# Patient Record
Sex: MALE | Race: WHITE | NOT HISPANIC OR LATINO | Employment: FULL TIME | URBAN - METROPOLITAN AREA
[De-identification: names, ages, dates, MRNs, and addresses within clinical notes are randomized per-mention and may not be internally consistent; named-entity substitution may affect disease eponyms.]

---

## 2023-09-07 ENCOUNTER — HOSPITAL ENCOUNTER (EMERGENCY)
Facility: HOSPITAL | Age: 68
Discharge: HOME | End: 2023-09-08
Attending: EMERGENCY MEDICINE
Payer: MEDICARE

## 2023-09-07 DIAGNOSIS — R13.19 ESOPHAGEAL DYSPHAGIA: ICD-10-CM

## 2023-09-07 DIAGNOSIS — K22.89 ESOPHAGEAL MASS: Primary | ICD-10-CM

## 2023-09-07 LAB
ALBUMIN SERPL BCP-MCNC: 3.5 G/DL (ref 3.5–5.2)
ALP SERPL-CCNC: 123 U/L (ref 55–135)
ALT SERPL W/O P-5'-P-CCNC: 15 U/L (ref 10–44)
ANION GAP SERPL CALC-SCNC: 13 MMOL/L (ref 8–16)
AST SERPL-CCNC: 25 U/L (ref 10–40)
BASOPHILS # BLD AUTO: 0.03 K/UL (ref 0–0.2)
BASOPHILS NFR BLD: 0.7 % (ref 0–1.9)
BILIRUB SERPL-MCNC: 0.5 MG/DL (ref 0.1–1)
BUN SERPL-MCNC: 10 MG/DL (ref 8–23)
CALCIUM SERPL-MCNC: 10.4 MG/DL (ref 8.7–10.5)
CHLORIDE SERPL-SCNC: 106 MMOL/L (ref 95–110)
CO2 SERPL-SCNC: 22 MMOL/L (ref 23–29)
CREAT SERPL-MCNC: 0.8 MG/DL (ref 0.5–1.4)
DIFFERENTIAL METHOD: ABNORMAL
EOSINOPHIL # BLD AUTO: 0.1 K/UL (ref 0–0.5)
EOSINOPHIL NFR BLD: 2 % (ref 0–8)
ERYTHROCYTE [DISTWIDTH] IN BLOOD BY AUTOMATED COUNT: 14.2 % (ref 11.5–14.5)
EST. GFR  (NO RACE VARIABLE): >60 ML/MIN/1.73 M^2
GLUCOSE SERPL-MCNC: 104 MG/DL (ref 70–110)
HCT VFR BLD AUTO: 31.3 % (ref 40–54)
HGB BLD-MCNC: 10.6 G/DL (ref 14–18)
IMM GRANULOCYTES # BLD AUTO: 0.02 K/UL (ref 0–0.04)
IMM GRANULOCYTES NFR BLD AUTO: 0.4 % (ref 0–0.5)
LYMPHOCYTES # BLD AUTO: 1.2 K/UL (ref 1–4.8)
LYMPHOCYTES NFR BLD: 25.5 % (ref 18–48)
MCH RBC QN AUTO: 33.7 PG (ref 27–31)
MCHC RBC AUTO-ENTMCNC: 33.9 G/DL (ref 32–36)
MCV RBC AUTO: 99 FL (ref 82–98)
MONOCYTES # BLD AUTO: 0.6 K/UL (ref 0.3–1)
MONOCYTES NFR BLD: 12.4 % (ref 4–15)
NEUTROPHILS # BLD AUTO: 2.7 K/UL (ref 1.8–7.7)
NEUTROPHILS NFR BLD: 59 % (ref 38–73)
NRBC BLD-RTO: 0 /100 WBC
PLATELET # BLD AUTO: 141 K/UL (ref 150–450)
PMV BLD AUTO: 10.2 FL (ref 9.2–12.9)
POTASSIUM SERPL-SCNC: 4 MMOL/L (ref 3.5–5.1)
PROT SERPL-MCNC: 6.9 G/DL (ref 6–8.4)
RBC # BLD AUTO: 3.15 M/UL (ref 4.6–6.2)
SODIUM SERPL-SCNC: 141 MMOL/L (ref 136–145)
WBC # BLD AUTO: 4.51 K/UL (ref 3.9–12.7)

## 2023-09-07 PROCEDURE — 25500020 PHARM REV CODE 255: Performed by: NURSE PRACTITIONER

## 2023-09-07 PROCEDURE — 96374 THER/PROPH/DIAG INJ IV PUSH: CPT

## 2023-09-07 PROCEDURE — 96375 TX/PRO/DX INJ NEW DRUG ADDON: CPT

## 2023-09-07 PROCEDURE — 63600175 PHARM REV CODE 636 W HCPCS: Performed by: NURSE PRACTITIONER

## 2023-09-07 PROCEDURE — 80053 COMPREHEN METABOLIC PANEL: CPT | Performed by: NURSE PRACTITIONER

## 2023-09-07 PROCEDURE — 85025 COMPLETE CBC W/AUTO DIFF WBC: CPT | Performed by: NURSE PRACTITIONER

## 2023-09-07 RX ORDER — ONDANSETRON 2 MG/ML
4 INJECTION INTRAMUSCULAR; INTRAVENOUS
Status: COMPLETED | OUTPATIENT
Start: 2023-09-07 | End: 2023-09-07

## 2023-09-07 RX ORDER — MORPHINE SULFATE 4 MG/ML
4 INJECTION, SOLUTION INTRAMUSCULAR; INTRAVENOUS
Status: COMPLETED | OUTPATIENT
Start: 2023-09-07 | End: 2023-09-07

## 2023-09-07 RX ADMIN — MORPHINE SULFATE 4 MG: 4 INJECTION INTRAVENOUS at 09:09

## 2023-09-07 RX ADMIN — ONDANSETRON 4 MG: 2 INJECTION INTRAMUSCULAR; INTRAVENOUS at 09:09

## 2023-09-07 RX ADMIN — IOHEXOL 100 ML: 350 INJECTION, SOLUTION INTRAVENOUS at 10:09

## 2023-09-08 VITALS
RESPIRATION RATE: 18 BRPM | DIASTOLIC BLOOD PRESSURE: 66 MMHG | WEIGHT: 172.94 LBS | SYSTOLIC BLOOD PRESSURE: 134 MMHG | TEMPERATURE: 98 F | BODY MASS INDEX: 22.92 KG/M2 | HEIGHT: 73 IN | OXYGEN SATURATION: 98 % | HEART RATE: 74 BPM

## 2023-09-08 PROCEDURE — 25000003 PHARM REV CODE 250: Performed by: EMERGENCY MEDICINE

## 2023-09-08 PROCEDURE — 99285 EMERGENCY DEPT VISIT HI MDM: CPT

## 2023-09-08 RX ORDER — HYDROCODONE BITARTRATE AND ACETAMINOPHEN 7.5; 325 MG/15ML; MG/15ML
5 SOLUTION ORAL
Status: COMPLETED | OUTPATIENT
Start: 2023-09-08 | End: 2023-09-08

## 2023-09-08 RX ORDER — HYDROCODONE BITARTRATE AND ACETAMINOPHEN 7.5; 325 MG/15ML; MG/15ML
10 SOLUTION ORAL EVERY 6 HOURS PRN
Qty: 180 ML | Refills: 0 | Status: SHIPPED | OUTPATIENT
Start: 2023-09-08

## 2023-09-08 RX ADMIN — HYDROCODONE BITARTRATE AND ACETAMINOPHEN 5 ML: 7.5; 325 SOLUTION ORAL at 01:09

## 2023-09-08 NOTE — FIRST PROVIDER EVALUATION
"Medical screening examination initiated.  I have conducted a focused provider triage encounter, findings are as follows:    Brief history of present illness:  Patient presents with difficulty swallowing and has blood in his feeding tube.  Has not used feeding tube in a couple of days.  Has not been eating due to pain.  History of esophageal cancer.    Vitals:    09/07/23 2105   BP: (!) 140/69   BP Location: Right arm   Patient Position: Sitting   Pulse: 86   Resp: 18   Temp: 98.1 °F (36.7 °C)   TempSrc: Oral   SpO2: 97%   Weight: 78.4 kg (172 lb 15.2 oz)   Height: 6' 1" (1.854 m)       Pertinent physical exam:  Vital signs stable, no acute distress noted    Brief workup plan:  Labs    Preliminary workup initiated; this workup will be continued and followed by the physician or advanced practice provider that is assigned to the patient when roomed.  "

## 2023-09-08 NOTE — ED PROVIDER NOTES
SCRIBE #1 NOTE: I, Chirag Cardona, am scribing for, and in the presence of, Cesario Lao MD. I have scribed the entire note.       History     Chief Complaint   Patient presents with    Dysphagia     Pt c/o throat pain, hx of esophageal cancer stage 4 and all treatment stopped last week per pt's wishes. Pt is unable to eat or drink and has had difficulty using feeding tube stating blood coming from tube for the last two days. Pt states pain is very unbearable at this point.     Review of patient's allergies indicates:  No Known Allergies      History of Present Illness     HPI    9/7/2023, 11:23 PM  History obtained from the patient      History of Present Illness: Nicholas Perez is a 68 y.o. male patient with a PMHx of esophageal CA stage 4 who presents to the Emergency Department for evaluation of dysphagia which onset gradually 2 days ago. Pt states he has a PET scan scheduled for the 11th but has stopped all treatment as he feels like the treatment is not working. Pt reports having dysphagia due to pain and notes his PEG tube has blood coming out of it. Pt is able to manage oral secretions. Symptoms are constant and moderate to severe in severity. No mitigating or exacerbating factors reported. Associated sxs include BLE numbness, blood in stool. Patient denies any fever, chills, abdominal pain, CP, SOB, constipation, n/v/d, and all other sxs at this time. No further complaints or concerns at this time.       Arrival mode: Personal vehicle    PCP: Vince Lara MD        Past Medical History:  Past Medical History:   Diagnosis Date    Hypertension        Past Surgical History:  Past Surgical History:   Procedure Laterality Date    TONSILLECTOMY           Family History:  No family history on file.    Social History:  Social History     Tobacco Use    Smoking status: Every Day    Smokeless tobacco: Not on file   Substance and Sexual Activity    Alcohol use: No    Drug use: Not on file    Sexual activity: Not  on file        Review of Systems     Review of Systems   Constitutional:  Positive for activity change (decreased PO intake due to pain). Negative for fever.   HENT:  Positive for trouble swallowing. Negative for sore throat.    Respiratory:  Negative for shortness of breath.    Cardiovascular:  Negative for chest pain.   Gastrointestinal:  Positive for blood in stool. Negative for nausea.   Genitourinary:  Negative for dysuria.   Musculoskeletal:  Negative for back pain.   Skin:  Negative for rash.   Neurological:  Positive for numbness (BLE). Negative for weakness.   Hematological:  Does not bruise/bleed easily.   All other systems reviewed and are negative.       Physical Exam     Initial Vitals [09/07/23 2105]   BP Pulse Resp Temp SpO2   (!) 140/69 86 18 98.1 °F (36.7 °C) 97 %      MAP       --          Physical Exam   Nursing Notes and Vital Signs Reviewed.  Constitutional: Patient is in no acute distress. Well-developed and well-nourished.  Head: Atraumatic. Normocephalic.  Eyes: PERRL. EOM intact. Conjunctivae are not pale. No scleral icterus.  ENT: Mucous membranes are moist. Oropharynx is clear and symmetric. Pt is able to manage oral secretions.  Neck: Supple. Full ROM. No lymphadenopathy.  Cardiovascular: Regular rate. Regular rhythm. No murmurs, rubs, or gallops. Distal pulses are 2+ and symmetric.  Pulmonary/Chest: No respiratory distress. Clear to auscultation bilaterally. No wheezing or rales.  Abdominal: Soft and non-distended.  There is no tenderness.  No rebound, guarding, or rigidity. Good bowel sounds. PEG tube in place and functioning appropriately.   Genitourinary: No CVA tenderness  Musculoskeletal: Moves all extremities. No obvious deformities. No edema. No calf tenderness.  Skin: Warm and dry.  Neurological:  Alert, awake, and appropriate.  Normal speech.  No acute focal neurological deficits are appreciated.  Psychiatric: Normal affect. Good eye contact. Appropriate in content.     ED  "Course   Procedures  ED Vital Signs:  Vitals:    09/07/23 2105 09/07/23 2159 09/07/23 2210 09/07/23 2211   BP: (!) 140/69  (!) 153/68    Pulse: 86  68 72   Resp: 18 18 16    Temp: 98.1 °F (36.7 °C)      TempSrc: Oral      SpO2: 97%  98%    Weight: 78.4 kg (172 lb 15.2 oz)      Height: 6' 1" (1.854 m)       09/07/23 2244 09/08/23 0031 09/08/23 0055 09/08/23 0116   BP: 129/66 (!) 125/59     Pulse: 76 80 72    Resp: (!) 47 (!) 56 18 18   Temp:       TempSrc:       SpO2: 99% 100% 98%    Weight:       Height:        09/08/23 0131   BP: 134/66   Pulse: 74   Resp:    Temp:    TempSrc:    SpO2: 98%   Weight:    Height:        Abnormal Lab Results:  Labs Reviewed   CBC W/ AUTO DIFFERENTIAL - Abnormal; Notable for the following components:       Result Value    RBC 3.15 (*)     Hemoglobin 10.6 (*)     Hematocrit 31.3 (*)     MCV 99 (*)     MCH 33.7 (*)     Platelets 141 (*)     All other components within normal limits   COMPREHENSIVE METABOLIC PANEL - Abnormal; Notable for the following components:    CO2 22 (*)     All other components within normal limits        All Lab Results:  Results for orders placed or performed during the hospital encounter of 09/07/23   CBC auto differential   Result Value Ref Range    WBC 4.51 3.90 - 12.70 K/uL    RBC 3.15 (L) 4.60 - 6.20 M/uL    Hemoglobin 10.6 (L) 14.0 - 18.0 g/dL    Hematocrit 31.3 (L) 40.0 - 54.0 %    MCV 99 (H) 82 - 98 fL    MCH 33.7 (H) 27.0 - 31.0 pg    MCHC 33.9 32.0 - 36.0 g/dL    RDW 14.2 11.5 - 14.5 %    Platelets 141 (L) 150 - 450 K/uL    MPV 10.2 9.2 - 12.9 fL    Immature Granulocytes 0.4 0.0 - 0.5 %    Gran # (ANC) 2.7 1.8 - 7.7 K/uL    Immature Grans (Abs) 0.02 0.00 - 0.04 K/uL    Lymph # 1.2 1.0 - 4.8 K/uL    Mono # 0.6 0.3 - 1.0 K/uL    Eos # 0.1 0.0 - 0.5 K/uL    Baso # 0.03 0.00 - 0.20 K/uL    nRBC 0 0 /100 WBC    Gran % 59.0 38.0 - 73.0 %    Lymph % 25.5 18.0 - 48.0 %    Mono % 12.4 4.0 - 15.0 %    Eosinophil % 2.0 0.0 - 8.0 %    Basophil % 0.7 0.0 - 1.9 %    " Differential Method Automated    Comprehensive metabolic panel   Result Value Ref Range    Sodium 141 136 - 145 mmol/L    Potassium 4.0 3.5 - 5.1 mmol/L    Chloride 106 95 - 110 mmol/L    CO2 22 (L) 23 - 29 mmol/L    Glucose 104 70 - 110 mg/dL    BUN 10 8 - 23 mg/dL    Creatinine 0.8 0.5 - 1.4 mg/dL    Calcium 10.4 8.7 - 10.5 mg/dL    Total Protein 6.9 6.0 - 8.4 g/dL    Albumin 3.5 3.5 - 5.2 g/dL    Total Bilirubin 0.5 0.1 - 1.0 mg/dL    Alkaline Phosphatase 123 55 - 135 U/L    AST 25 10 - 40 U/L    ALT 15 10 - 44 U/L    eGFR >60 >60 mL/min/1.73 m^2    Anion Gap 13 8 - 16 mmol/L         Imaging Results:  Imaging Results               CT Abdomen Pelvis With Contrast (Final result)  Result time 09/07/23 22:49:31      Final result by Yifan Juárez MD (09/07/23 22:49:31)                   Impression:      Obstructing esophageal mass protruding into the stomach and numerous hepatic metastatic lesions as above.  Mixed density material within the visualized esophagus consistent with obstruction.    Gastrostomy tube in place in good position.    This report was flagged in Epic as abnormal.    All CT scans at this facility are performed  using dose modulation techniques as appropriate to performed exam including the following:  automated exposure control; adjustment of mA and/or kV according to the patients size (this includes techniques or standardized protocols for targeted exams where dose is matched to indication/reason for exam: i.e. extremities or head);  iterative reconstruction technique.      Electronically signed by: Yifan Juárez  Date:    09/07/2023  Time:    22:49               Narrative:    EXAMINATION:  CT ABDOMEN PELVIS WITH CONTRAST    CLINICAL HISTORY:  Abdominal pain, acute, nonlocalized;    TECHNIQUE:  Low dose axial images, sagittal and coronal reformations were obtained from the lung bases to the pubic symphysis.  Contrast was not administered.    COMPARISON:  None    FINDINGS:  Heart: Normal in  size. No pericardial effusion.    Lung Bases: Presumed esophageal mass proceeding into the stomach with probable obstruction noting upstream mixed density material in the esophagus.    Liver: Numerous metastatic lesions throughout the liver.    Gallbladder: Surgically absent.    Bile Ducts: No evidence of dilated ducts.    Pancreas: No mass or peripancreatic fat stranding.    Spleen: Unremarkable.    Adrenals: Unremarkable.    Kidneys/ Ureters: Right renal simple cyst.  No hydronephrosis.    Bladder: No evidence of wall thickening.    Reproductive organs: Unremarkable.    GI Tract/Mesentery: No evidence of bowel obstruction or inflammation.  Gastrostomy tube in place.    Peritoneal Space: No ascites. No free air.    Retroperitoneum: No significant adenopathy.    Abdominal wall: Unremarkable.    Vasculature: No significant atherosclerosis or aneurysm.    Bones: No acute fracture.                        Final result by Yifan Juárez MD (09/07/23 22:49:31)                   Impression:      Obstructing esophageal mass protruding into the stomach and numerous hepatic metastatic lesions as above.  Mixed density material within the visualized esophagus consistent with obstruction.    Gastrostomy tube in place in good position.    This report was flagged in Epic as abnormal.    All CT scans at this facility are performed  using dose modulation techniques as appropriate to performed exam including the following:  automated exposure control; adjustment of mA and/or kV according to the patients size (this includes techniques or standardized protocols for targeted exams where dose is matched to indication/reason for exam: i.e. extremities or head);  iterative reconstruction technique.      Electronically signed by: Yifan Juárez  Date:    09/07/2023  Time:    22:49               Narrative:    EXAMINATION:  CT ABDOMEN PELVIS WITH CONTRAST    CLINICAL HISTORY:  Abdominal pain, acute, nonlocalized;    TECHNIQUE:  Low dose  axial images, sagittal and coronal reformations were obtained from the lung bases to the pubic symphysis.  Contrast was not administered.    COMPARISON:  None    FINDINGS:  Heart: Normal in size. No pericardial effusion.    Lung Bases: Presumed esophageal mass proceeding into the stomach with probable obstruction noting upstream mixed density material in the esophagus.    Liver: Numerous metastatic lesions throughout the liver.    Gallbladder: Surgically absent.    Bile Ducts: No evidence of dilated ducts.    Pancreas: No mass or peripancreatic fat stranding.    Spleen: Unremarkable.    Adrenals: Unremarkable.    Kidneys/ Ureters: Right renal simple cyst.  No hydronephrosis.    Bladder: No evidence of wall thickening.    Reproductive organs: Unremarkable.    GI Tract/Mesentery: No evidence of bowel obstruction or inflammation.  Gastrostomy tube in place.    Peritoneal Space: No ascites. No free air.    Retroperitoneum: No significant adenopathy.    Abdominal wall: Unremarkable.    Vasculature: No significant atherosclerosis or aneurysm.    Bones: No acute fracture.                        Final result by Yifan Juárez MD (09/07/23 22:49:31)                   Impression:      Obstructing esophageal mass protruding into the stomach and numerous hepatic metastatic lesions as above.  Mixed density material within the visualized esophagus consistent with obstruction.    Gastrostomy tube in place in good position.    This report was flagged in Epic as abnormal.    All CT scans at this facility are performed  using dose modulation techniques as appropriate to performed exam including the following:  automated exposure control; adjustment of mA and/or kV according to the patients size (this includes techniques or standardized protocols for targeted exams where dose is matched to indication/reason for exam: i.e. extremities or head);  iterative reconstruction technique.      Electronically signed by: Yifan  Franck  Date:    09/07/2023  Time:    22:49               Narrative:    EXAMINATION:  CT ABDOMEN PELVIS WITH CONTRAST    CLINICAL HISTORY:  Abdominal pain, acute, nonlocalized;    TECHNIQUE:  Low dose axial images, sagittal and coronal reformations were obtained from the lung bases to the pubic symphysis.  Contrast was not administered.    COMPARISON:  None    FINDINGS:  Heart: Normal in size. No pericardial effusion.    Lung Bases: Presumed esophageal mass proceeding into the stomach with probable obstruction noting upstream mixed density material in the esophagus.    Liver: Numerous metastatic lesions throughout the liver.    Gallbladder: Surgically absent.    Bile Ducts: No evidence of dilated ducts.    Pancreas: No mass or peripancreatic fat stranding.    Spleen: Unremarkable.    Adrenals: Unremarkable.    Kidneys/ Ureters: Right renal simple cyst.  No hydronephrosis.    Bladder: No evidence of wall thickening.    Reproductive organs: Unremarkable.    GI Tract/Mesentery: No evidence of bowel obstruction or inflammation.  Gastrostomy tube in place.    Peritoneal Space: No ascites. No free air.    Retroperitoneum: No significant adenopathy.    Abdominal wall: Unremarkable.    Vasculature: No significant atherosclerosis or aneurysm.    Bones: No acute fracture.                                         The Emergency Provider reviewed the vital signs and test results, which are outlined above.     ED Discussion       1:38 AM: Reassessed pt at this time. Pt states he will call his PCP in [place] tomorrow to discuss esophageal stent placement. Discussed with pt all pertinent ED information and results. Discussed pt dx and plan of tx. Gave pt all f/u and return to the ED instructions. All questions and concerns were addressed at this time. Pt expresses understanding of information and instructions, and is comfortable with plan to discharge. Pt is stable for discharge.    I discussed with patient and/or  family/caretaker that evaluation in the ED does not suggest any emergent or life threatening medical conditions requiring immediate intervention beyond what was provided in the ED, and I believe patient is safe for discharge.  Regardless, an unremarkable evaluation in the ED does not preclude the development or presence of a serious of life threatening condition. As such, patient was instructed to return immediately for any worsening or change in current symptoms.         Medical Decision Making  Amount and/or Complexity of Data Reviewed  Labs: ordered. Decision-making details documented in ED Course.  Radiology: ordered. Decision-making details documented in ED Course.  Discussion of management or test interpretation with external provider(s): 67 yo with h/o esophageal ca with liver mets presents with dysphagia x about 1 week.  Pt tolerates saliva.    CT report - Obstructing esophageal mass protruding into the stomach and numerous hepatic metastatic lesions as above.  Mixed density material within the visualized esophagus consistent with obstruction.  Gastrostomy tube in place in good position.  Consulting Dr. Chowdhury (Gastroenterology) to determined if pt benefit from esophageal stent. Dr. Chowdhury states there are no esophageal stent services in  at this time.       Risk  Prescription drug management.                 ED Medication(s):  Medications   morphine injection 4 mg (4 mg Intravenous Given 9/7/23 2159)   ondansetron injection 4 mg (4 mg Intravenous Given 9/7/23 2156)   iohexoL (OMNIPAQUE 350) injection 100 mL (100 mLs Intravenous Given 9/7/23 2236)   hydrocodone-apap 7.5-325 MG/15 ML oral solution 5 mL (5 mLs Per G Tube Given 9/8/23 0116)       Discharge Medication List as of 9/8/2023  1:43 AM        START taking these medications    Details   hydrocodone-acetaminophen (HYCET) solution 7.5-325 mg/15mL Take 10 mLs by mouth every 6 (six) hours as needed for Pain., Starting Fri 9/8/2023, Print               Follow-up Information       Vince Lara MD In 1 day.    Specialty: Family Medicine  Contact information:  [address] [place] [address]  [phone]               [facility] - Emergency Dept..    Specialty: Emergency Medicine  Why: As needed, If symptoms worsen  Contact information:  [address] [facility] [address] [phone]             [facility] In 3 days.    Specialty: Hematology and Oncology  Contact information:  [address] [facility] [address] [phone]                               Scribe Attestation:   Scribe #1: I performed the above scribed service and the documentation accurately describes the services I performed. I attest to the accuracy of the note.     Attending:   Physician Attestation Statement for Scribe #1: I, Cesario Lao MD, personally performed the services described in this documentation, as scribed by Chirag Cardona, in my presence, and it is both accurate and complete.           Clinical Impression       ICD-10-CM ICD-9-CM   1. Esophageal mass  K22.89 530.89   2. Esophageal dysphagia  R13.19 787.29       Disposition:   Disposition: Discharged  Condition: Stable         Cesario Lao MD  09/08/23 0537

## 2023-09-18 ENCOUNTER — DOCUMENTATION ONLY (OUTPATIENT)
Dept: HEMATOLOGY/ONCOLOGY | Facility: CLINIC | Age: 68
End: 2023-09-18
Payer: MEDICARE

## 2023-09-18 ENCOUNTER — TELEPHONE (OUTPATIENT)
Dept: HEMATOLOGY/ONCOLOGY | Facility: CLINIC | Age: 68
End: 2023-09-18
Payer: MEDICARE

## 2023-09-18 NOTE — NURSING
1150am: Outside records received from [facility] [facility]. Still need Radiology Imaging discs. Will contact pt for appt scheduling.

## 2023-09-18 NOTE — NURSING
1345pm: Outgoing call to pt regarding self referral for new pt appt for 2nd opinion. Spoke with pt wife, Minda. Explained to pt wife that we just received pt medical records from [employer] today. Also, explained to pt wife that when I attempted to schedule pt, I was alerted that [facility] is out of network for pt insurance coverage. Pt only has a primary ins coverage. I informed pt wife that pt would be responsible for whatever charges pt ins won't cover bc pt is out of network. I directed pt wife to KERI Cid to discuss in detail. I gave pt wife my direct contact info so pt can let me know how pt would like to proceed. Pt wife verbalized understanding.

## 2023-09-18 NOTE — NURSING
1544pm: Outgoing call to pt after pt spoke with KERI Cid, about pt being out of network and what cost would be. Pt decided to proceed with appt after speaking with [facility]. Pt scheduled on 9/27 at 11 am at  with Dr. Payne. Pt wife given location directions. Pt wife verbalized understanding. Pt plan to report to appt as scheduled.   Oncology Navigation   Intake  Date of Diagnosis: 01/30/23  Cancer Type: GI (Esophageal)  Internal / External Referral: External (self-referral)  Date of Referral: 09/13/23  Initial Nurse Navigator Contact: 09/18/23  Referral to Initial Contact Timeline (days): 5  Date Worked: 09/18/23  Appointment Date: 09/27/23  Schedule to Appointment Timeline (days): 9  Reason if booked > 7 days after scheduling: Other  Other reason booked > 7 days: specific location request     Treatment                              Acuity      Follow Up  No follow-ups on file.

## 2023-09-26 ENCOUNTER — DOCUMENTATION ONLY (OUTPATIENT)
Dept: HEMATOLOGY/ONCOLOGY | Facility: CLINIC | Age: 68
End: 2023-09-26
Payer: MEDICARE

## 2023-09-26 NOTE — PROGRESS NOTES
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  male with past medical history significant for GERD, hypertension is here for evaluation and 2nd opinion for metastatic adenocarcinoma of the gastroesophageal junction    Patient is being seen at [facility] in [facility].     Patient initially started having dysphagia to solid food and had an EGD done on 01/30/2023 with multiple lesions biopsied in the distal esophagus and noted a lesion at 38 cm which was necrotic appearing esophageal mass that totally occluded the lumen of the esophagus, biopsy was positive for adenocarcinoma.  PET scan done on 02/09/2023 which showed avid gastroesophageal junction mass with extensive liver metastasis as well as extensive adenopathy including in the perigastric and gastrohepatic region, retroperitoneal region and posterior mediastinal region in addition to avid left internal mammary node.  Patient was started on treatment with FOLFOX plus Herceptin     Later on diagnosed with pulmonary embolism and started on Eliquis, which was held in July of 2023 for black stools. Patient is here for 2nd opinion and further recommendations.    Interval History:         Oncology History    No history exists.       Past Medical History:   Diagnosis Date    Hypertension       Past Surgical History:   Procedure Laterality Date    TONSILLECTOMY       Social History     Socioeconomic History    Marital status:    Tobacco Use    Smoking status: Every Day   Substance and Sexual Activity    Alcohol use: No      No family history on file.       Review of patient's allergies indicates:  No Known Allergies     Review of Systems   Unable to perform ROS         Objective:        Vitals:    09/27/23 1052   BP: 129/77   Pulse: 70   Temp: 97.8 °F (36.6 °C)        Physical Exam  Not done as patient left     LABS / IMAGING      - 02/09/2023 PET scan:  GE junction mass with extensive metastatic disease      - 01/30/2023 esophageal lesion, 36 cm biopsy:  Adenocarcinoma  - MMR protein nuclear expression retained    Assessment:         METASTATIC ADENOCARCINOMA OF THE GASTROESOPHAGEAL JUNCTION  - Started on treatment with FOLFOX plus Herceptin.   - CT scan in July 2023, stable metastatic disease but noted new pulmonary emboli bilaterally.   - Patient continued with FOLFOX with decreased dose of oxaliplatin and Herceptin.    - repeat echogram done in October 2023 with ejection fraction of 50-55%.    PULMONARY EMBOLISM  - Diagnosed on CT scan in July 2023 with bilateral PE, treated with Eliquis 5 mg b.i.d., later discontinued because of black stools in the ER.  He was later restarted on Eliquis 2.5 mg b.i.d.    Dysphagia  - tube feeding       Plan:       - Patient with diagnosis of metastatic gastric cancer since January 2023 and has been on FOLFOX and Herceptin. Also followed with repeat scans and was stable till last reported imaging. He now had repeat PET scan but still waiting on the final report. He is here for 2nd opinion and consideration for clinical trials.  Discussed the current management plan. Patient wants further evaluation for clinical trials and would refer to [facility] for further recommendations.   - requested to get other records from primary physician when being evaluated for 2nd opinion including NGS, PD-L1 status. Also would need to get the previous imaging to compare with the recent images.   - RTC as needed         Med Onc Chart Routing      Follow up with physician . RTC as needed   Follow up with [facility]    Infusion scheduling note    Injection scheduling note    Labs    Imaging    Pharmacy appointment    Other referrals         Patient wants to be evaluated  for clinical trials and would recommend to schedule at [facility]               The patient was seen, interviewed and examined. Pertinent lab and radiology studies were reviewed. Pt instructed to call should develop concerning signs/symptoms or have further questions.       Portions of the record may have been created with voice recognition software. Occasional wrong-word or sound-a-like substitutions may have occurred due to the inherent limitations of voice recognition software. Read the chart carefully and recognize, using context, where substitutions have occurred.    Salomón Payne MD  Hematology / Oncology

## 2023-09-26 NOTE — NURSING
0950am: Radiology imaging disc received from outside facility. [facility] into [facility] chart.   Oncology Navigation   Intake  Date of Diagnosis: 01/30/23  Cancer Type: GI (Esophageal)  Internal / External Referral: External (self-referral)  Date of Referral: 09/13/23  Initial Nurse Navigator Contact: 09/18/23  Referral to Initial Contact Timeline (days): 5  Date Worked: 09/26/23  Appointment Date: 09/27/23  Schedule to Appointment Timeline (days): 9  Reason if booked > 7 days after scheduling: Other  Other reason booked > 7 days: specific location request     Treatment                              Acuity      Follow Up  No follow-ups on file.

## 2023-09-27 ENCOUNTER — OFFICE VISIT (OUTPATIENT)
Dept: HEMATOLOGY/ONCOLOGY | Facility: CLINIC | Age: 68
End: 2023-09-27
Payer: MEDICARE

## 2023-09-27 VITALS
OXYGEN SATURATION: 100 % | HEART RATE: 70 BPM | HEIGHT: 73 IN | WEIGHT: 170.44 LBS | TEMPERATURE: 98 F | SYSTOLIC BLOOD PRESSURE: 129 MMHG | BODY MASS INDEX: 22.59 KG/M2 | DIASTOLIC BLOOD PRESSURE: 77 MMHG

## 2023-09-27 DIAGNOSIS — C16.0 ADENOCARCINOMA OF GASTROESOPHAGEAL JUNCTION: Primary | ICD-10-CM

## 2023-09-27 PROCEDURE — 1159F PR MEDICATION LIST DOCUMENTED IN MEDICAL RECORD: ICD-10-PCS | Mod: CPTII,S$GLB,, | Performed by: INTERNAL MEDICINE

## 2023-09-27 PROCEDURE — 3288F PR FALLS RISK ASSESSMENT DOCUMENTED: ICD-10-PCS | Mod: CPTII,S$GLB,, | Performed by: INTERNAL MEDICINE

## 2023-09-27 PROCEDURE — 1159F MED LIST DOCD IN RCRD: CPT | Mod: CPTII,S$GLB,, | Performed by: INTERNAL MEDICINE

## 2023-09-27 PROCEDURE — 99203 OFFICE O/P NEW LOW 30 MIN: CPT | Mod: S$GLB,,, | Performed by: INTERNAL MEDICINE

## 2023-09-27 PROCEDURE — 3078F PR MOST RECENT DIASTOLIC BLOOD PRESSURE < 80 MM HG: ICD-10-PCS | Mod: CPTII,S$GLB,, | Performed by: INTERNAL MEDICINE

## 2023-09-27 PROCEDURE — 3074F SYST BP LT 130 MM HG: CPT | Mod: CPTII,S$GLB,, | Performed by: INTERNAL MEDICINE

## 2023-09-27 PROCEDURE — 3008F PR BODY MASS INDEX (BMI) DOCUMENTED: ICD-10-PCS | Mod: CPTII,S$GLB,, | Performed by: INTERNAL MEDICINE

## 2023-09-27 PROCEDURE — 1101F PR PT FALLS ASSESS DOC 0-1 FALLS W/OUT INJ PAST YR: ICD-10-PCS | Mod: CPTII,S$GLB,, | Performed by: INTERNAL MEDICINE

## 2023-09-27 PROCEDURE — 1126F AMNT PAIN NOTED NONE PRSNT: CPT | Mod: CPTII,S$GLB,, | Performed by: INTERNAL MEDICINE

## 2023-09-27 PROCEDURE — 99203 PR OFFICE/OUTPT VISIT, NEW, LEVL III, 30-44 MIN: ICD-10-PCS | Mod: S$GLB,,, | Performed by: INTERNAL MEDICINE

## 2023-09-27 PROCEDURE — 1101F PT FALLS ASSESS-DOCD LE1/YR: CPT | Mod: CPTII,S$GLB,, | Performed by: INTERNAL MEDICINE

## 2023-09-27 PROCEDURE — 99999 PR PBB SHADOW E&M-EST. PATIENT-LVL III: ICD-10-PCS | Mod: PBBFAC,,, | Performed by: INTERNAL MEDICINE

## 2023-09-27 PROCEDURE — 3288F FALL RISK ASSESSMENT DOCD: CPT | Mod: CPTII,S$GLB,, | Performed by: INTERNAL MEDICINE

## 2023-09-27 PROCEDURE — 4010F PR ACE/ARB THEARPY RXD/TAKEN: ICD-10-PCS | Mod: CPTII,S$GLB,, | Performed by: INTERNAL MEDICINE

## 2023-09-27 PROCEDURE — 4010F ACE/ARB THERAPY RXD/TAKEN: CPT | Mod: CPTII,S$GLB,, | Performed by: INTERNAL MEDICINE

## 2023-09-27 PROCEDURE — 3074F PR MOST RECENT SYSTOLIC BLOOD PRESSURE < 130 MM HG: ICD-10-PCS | Mod: CPTII,S$GLB,, | Performed by: INTERNAL MEDICINE

## 2023-09-27 PROCEDURE — 99999 PR PBB SHADOW E&M-EST. PATIENT-LVL III: CPT | Mod: PBBFAC,,, | Performed by: INTERNAL MEDICINE

## 2023-09-27 PROCEDURE — 3078F DIAST BP <80 MM HG: CPT | Mod: CPTII,S$GLB,, | Performed by: INTERNAL MEDICINE

## 2023-09-27 PROCEDURE — 1126F PR PAIN SEVERITY QUANTIFIED, NO PAIN PRESENT: ICD-10-PCS | Mod: CPTII,S$GLB,, | Performed by: INTERNAL MEDICINE

## 2023-09-27 PROCEDURE — 3008F BODY MASS INDEX DOCD: CPT | Mod: CPTII,S$GLB,, | Performed by: INTERNAL MEDICINE

## 2023-09-27 RX ORDER — AMLODIPINE BESYLATE 10 MG/1
10 TABLET ORAL DAILY
COMMUNITY
Start: 2023-08-06

## 2023-09-27 RX ORDER — ONDANSETRON 8 MG/1
8 TABLET, ORALLY DISINTEGRATING ORAL
COMMUNITY
Start: 2023-04-17

## 2023-09-27 RX ORDER — TRAZODONE HYDROCHLORIDE 50 MG/1
50 TABLET ORAL
COMMUNITY
Start: 2023-08-16

## 2023-09-27 RX ORDER — OMEPRAZOLE 40 MG/1
1 CAPSULE, DELAYED RELEASE ORAL DAILY
COMMUNITY
Start: 2023-04-17

## 2023-10-03 ENCOUNTER — TELEPHONE (OUTPATIENT)
Dept: HEMATOLOGY/ONCOLOGY | Facility: CLINIC | Age: 68
End: 2023-10-03
Payer: MEDICARE

## 2023-10-03 NOTE — TELEPHONE ENCOUNTER
Spoke with Mr and Mrs Perez offered a virtual visit with Dr Rivera to initiate a peer to peer with MD [facility]. Per Mrs Perez she will discuss the offer with her family.  Mrs Perez will return call to our clinic if they decide to proceed.